# Patient Record
Sex: FEMALE | Race: WHITE | NOT HISPANIC OR LATINO | URBAN - NONMETROPOLITAN AREA
[De-identification: names, ages, dates, MRNs, and addresses within clinical notes are randomized per-mention and may not be internally consistent; named-entity substitution may affect disease eponyms.]

---

## 2024-09-26 ENCOUNTER — OFFICE VISIT (OUTPATIENT)
Dept: PRIMARY CARE | Facility: CLINIC | Age: 24
End: 2024-09-26
Payer: COMMERCIAL

## 2024-09-26 VITALS
HEIGHT: 62 IN | OXYGEN SATURATION: 98 % | SYSTOLIC BLOOD PRESSURE: 106 MMHG | HEART RATE: 88 BPM | WEIGHT: 124 LBS | BODY MASS INDEX: 22.82 KG/M2 | DIASTOLIC BLOOD PRESSURE: 70 MMHG

## 2024-09-26 DIAGNOSIS — J45.20 MILD INTERMITTENT REACTIVE AIRWAY DISEASE WITH WHEEZING WITHOUT COMPLICATION (HHS-HCC): Primary | ICD-10-CM

## 2024-09-26 DIAGNOSIS — R14.0 ABDOMINAL BLOATING: ICD-10-CM

## 2024-09-26 LAB
BASOPHILS # BLD AUTO: 0.07 X10*3/UL (ref 0–0.1)
BASOPHILS NFR BLD AUTO: 1 %
EOSINOPHIL # BLD AUTO: 0.16 X10*3/UL (ref 0–0.7)
EOSINOPHIL NFR BLD AUTO: 2.2 %
ERYTHROCYTE [DISTWIDTH] IN BLOOD BY AUTOMATED COUNT: 13.9 % (ref 11.5–14.5)
HCT VFR BLD AUTO: 42.7 % (ref 36–46)
HGB BLD-MCNC: 13.9 G/DL (ref 12–16)
IMM GRANULOCYTES # BLD AUTO: 0.02 X10*3/UL (ref 0–0.7)
IMM GRANULOCYTES NFR BLD AUTO: 0.3 % (ref 0–0.9)
LYMPHOCYTES # BLD AUTO: 1.59 X10*3/UL (ref 1.2–4.8)
LYMPHOCYTES NFR BLD AUTO: 22.1 %
MCH RBC QN AUTO: 31.3 PG (ref 26–34)
MCHC RBC AUTO-ENTMCNC: 32.6 G/DL (ref 32–36)
MCV RBC AUTO: 96 FL (ref 80–100)
MONOCYTES # BLD AUTO: 0.56 X10*3/UL (ref 0.1–1)
MONOCYTES NFR BLD AUTO: 7.8 %
NEUTROPHILS # BLD AUTO: 4.8 X10*3/UL (ref 1.2–7.7)
NEUTROPHILS NFR BLD AUTO: 66.6 %
NRBC BLD-RTO: 0 /100 WBCS (ref 0–0)
PLATELET # BLD AUTO: 276 X10*3/UL (ref 150–450)
RBC # BLD AUTO: 4.44 X10*6/UL (ref 4–5.2)
TSH SERPL-ACNC: 2.78 MIU/L (ref 0.44–3.98)
WBC # BLD AUTO: 7.2 X10*3/UL (ref 4.4–11.3)

## 2024-09-26 PROCEDURE — 99213 OFFICE O/P EST LOW 20 MIN: CPT

## 2024-09-26 PROCEDURE — 3008F BODY MASS INDEX DOCD: CPT

## 2024-09-26 PROCEDURE — 85025 COMPLETE CBC W/AUTO DIFF WBC: CPT

## 2024-09-26 PROCEDURE — 83516 IMMUNOASSAY NONANTIBODY: CPT

## 2024-09-26 PROCEDURE — 86003 ALLG SPEC IGE CRUDE XTRC EA: CPT

## 2024-09-26 PROCEDURE — 84443 ASSAY THYROID STIM HORMONE: CPT

## 2024-09-26 PROCEDURE — 1036F TOBACCO NON-USER: CPT

## 2024-09-26 ASSESSMENT — PATIENT HEALTH QUESTIONNAIRE - PHQ9
2. FEELING DOWN, DEPRESSED OR HOPELESS: NOT AT ALL
SUM OF ALL RESPONSES TO PHQ9 QUESTIONS 1 AND 2: 0
1. LITTLE INTEREST OR PLEASURE IN DOING THINGS: NOT AT ALL

## 2024-09-26 NOTE — PROGRESS NOTES
Subjective   Patient ID: Rissa Rios is a 23 y.o. female who presents for Bloated and Abdominal Pain (Stomach gets hard for few years off n on).    HPI  - Passed a large clot/chunk of tissue vaginally about golf ball sized about 3 years ago. She was having abdominal cramping at that time worse than a period. Period was 3 days late, and then 4 days later afterward she got her period. She had a positive pregnancy test at that time.   - Since then, she has had significant bloating. Intermittent at first, now more persistent. Has some tenderness to palpation in the lower abdomen but more so just bloating.  - Had internal and external pelvic exam and found bleeding around her ovaries 3 years ago, just watched   - Periods are heavy the first day, bad cramps to start the first day and then better. Lasts 4-5 days.  - Not on OCPs, when she was it did not help anyway   - Does not have an OBGYN -- previously saw Dr. Samaniego at [facility].   - Has some nausea. No v/d/c. Denies hematochezia, melena  - Urinary frequency. Denies hematuria, burning , itching  - Cannot tell if any foods are related.   - denies drugs, occasional alcohol, tobacco   - in a relationship, having sex 1-2 times per month. Has not been pregnant since above mentioned incident and not trying.     No current outpatient medications on file.   History reviewed. No pertinent surgical history.   Past Medical History:   Diagnosis Date    Low back pain, unspecified 11/08/2017    Lumbar pain    Other conditions influencing health status 02/21/2018    History of burning on urination    Personal history of other diseases of the respiratory system 12/19/2018    History of laryngitis    Personal history of other specified conditions 02/05/2017    History of syncope    Right lower quadrant pain 12/04/2019    Abdominal pain, RLQ (right lower quadrant)    Urinary tract infection, site not specified 02/21/2018    Acute lower UTI     Social History     Tobacco Use    Smoking  "status: Never    Smokeless tobacco: Never   Vaping Use    Vaping status: Some Days    Substances: Nicotine      No family history on file.   Review of Systems  10 point ROS negative except as otherwise noted in the HPI.      Objective   /70   Pulse 88   Ht 1.562 m (5' 1.5\")   Wt 56.2 kg (124 lb)   SpO2 98%   BMI 23.05 kg/m²    Physical Exam  Constitutional:       General: She is not in acute distress.     Appearance: Normal appearance.   HENT:      Head: Normocephalic and atraumatic.   Cardiovascular:      Rate and Rhythm: Normal rate and regular rhythm.      Pulses: Normal pulses.      Heart sounds: Normal heart sounds.   Pulmonary:      Effort: Pulmonary effort is normal.      Breath sounds: Normal breath sounds.   Abdominal:      General: Abdomen is flat. Bowel sounds are normal. There is no distension.      Palpations: Abdomen is soft. There is no mass.      Tenderness: There is abdominal tenderness. There is no guarding or rebound.      Comments: Diffuse TTP, worst in lower abdomen and b/l adnexa   Musculoskeletal:         General: Normal range of motion.   Skin:     General: Skin is warm and dry.   Neurological:      General: No focal deficit present.      Mental Status: She is alert and oriented to person, place, and time.   Psychiatric:         Mood and Affect: Mood normal.         Behavior: Behavior normal.           Assessment/Plan   Problem List Items Addressed This Visit       Abdominal bloating  - Pt w likely passed fetal products 3 years ago, reviewed picture and given the appearance and positive pregnancy test, this is most likely.   - Does not seem to be related to meals or to her menstrual cycle   - Will check CBC, TSH, food allergy and celiac given degree of bloating  - US pelvis ordered  - OBGYN referral placed, is also due for pap   - Will call w results     Relevant Orders    Celiac Panel    Food Allergy Profile IgE    Referral to Obstetrics / Gynecology    TSH with reflex to Free T4 " if abnormal    CBC and Auto Differential    US PELVIS TRANSABDOMINAL WITH TRANSVAGINAL       Discussed at visit any disease processes that were of concern as well as the risks, benefits and instructions on any new medication provided. Patient (and/or caretaker of patient if present) stated all questions were answered, and they voiced understanding of instructions.     Yesenia Calle PA-C

## 2024-09-26 NOTE — PATIENT INSTRUCTIONS
Obs in the area in include the [facility] and [facility]     We will order ultrasounda and some blood work and put in a referral for you to go to OB for further workup and to re-establish care for pap.    Epley Manuever for the vertigo- try looking it up on youtube

## 2024-09-27 LAB
CLAM IGE QN: <0.1 KU/L
CODFISH IGE QN: <0.1 KU/L
CORN IGE QN: <0.1
EGG WHITE IGE QN: <0.1 KU/L
GLIADIN PEPTIDE IGA SER IA-ACNC: <1 U/ML
MILK IGE QN: <0.1 KU/L
PEANUT IGE QN: <0.1 KU/L
SCALLOP IGE QN: <0.1 KU/L
SESAME SEED IGE QN: <0.1 KU/L
SHRIMP IGE QN: <0.1 KU/L
SOYBEAN IGE QN: <0.1 KU/L
TTG IGA SER IA-ACNC: <1 U/ML
WALNUT IGE QN: <0.1 KU/L
WHEAT IGE QN: <0.1 KU/L

## 2024-09-28 LAB
GLIADIN PEPTIDE IGG SER IA-ACNC: <0.56 FLU (ref 0–4.99)
TTG IGG SER IA-ACNC: <0.82 FLU (ref 0–4.99)

## 2024-09-30 ENCOUNTER — APPOINTMENT (OUTPATIENT)
Dept: OBSTETRICS AND GYNECOLOGY | Facility: CLINIC | Age: 24
End: 2024-09-30
Payer: COMMERCIAL

## 2024-09-30 VITALS — BODY MASS INDEX: 22.79 KG/M2 | DIASTOLIC BLOOD PRESSURE: 66 MMHG | SYSTOLIC BLOOD PRESSURE: 104 MMHG | WEIGHT: 122.6 LBS

## 2024-09-30 DIAGNOSIS — N94.10 DYSPAREUNIA IN FEMALE: Primary | ICD-10-CM

## 2024-09-30 DIAGNOSIS — Z32.02 PREGNANCY TEST NEGATIVE: ICD-10-CM

## 2024-09-30 DIAGNOSIS — R39.15 URINARY URGENCY: ICD-10-CM

## 2024-09-30 DIAGNOSIS — Z11.3 SCREEN FOR STD (SEXUALLY TRANSMITTED DISEASE): ICD-10-CM

## 2024-09-30 DIAGNOSIS — Z12.4 SCREENING FOR MALIGNANT NEOPLASM OF CERVIX: ICD-10-CM

## 2024-09-30 DIAGNOSIS — R10.2 PELVIC PAIN IN FEMALE: ICD-10-CM

## 2024-09-30 DIAGNOSIS — Z11.51 SCREENING FOR HUMAN PAPILLOMAVIRUS (HPV): ICD-10-CM

## 2024-09-30 DIAGNOSIS — R14.0 ABDOMINAL BLOATING: ICD-10-CM

## 2024-09-30 LAB
POC APPEARANCE, URINE: CLEAR
POC BILIRUBIN, URINE: NEGATIVE
POC BLOOD, URINE: NEGATIVE
POC COLOR, URINE: YELLOW
POC GLUCOSE, URINE: NEGATIVE MG/DL
POC KETONES, URINE: NEGATIVE MG/DL
POC LEUKOCYTES, URINE: NEGATIVE
POC NITRITE,URINE: NEGATIVE
POC PH, URINE: 6.5 PH
POC PROTEIN, URINE: NEGATIVE MG/DL
POC SPECIFIC GRAVITY, URINE: 1.01
POC UROBILINOGEN, URINE: 0.2 EU/DL
PREGNANCY TEST URINE, POC: NEGATIVE

## 2024-09-30 PROCEDURE — 87205 SMEAR GRAM STAIN: CPT

## 2024-09-30 PROCEDURE — 81025 URINE PREGNANCY TEST: CPT | Performed by: STUDENT IN AN ORGANIZED HEALTH CARE EDUCATION/TRAINING PROGRAM

## 2024-09-30 PROCEDURE — 81002 URINALYSIS NONAUTO W/O SCOPE: CPT | Performed by: STUDENT IN AN ORGANIZED HEALTH CARE EDUCATION/TRAINING PROGRAM

## 2024-09-30 PROCEDURE — 99203 OFFICE O/P NEW LOW 30 MIN: CPT | Performed by: STUDENT IN AN ORGANIZED HEALTH CARE EDUCATION/TRAINING PROGRAM

## 2024-09-30 PROCEDURE — 87591 N.GONORRHOEAE DNA AMP PROB: CPT

## 2024-09-30 PROCEDURE — 87491 CHLMYD TRACH DNA AMP PROBE: CPT

## 2024-09-30 PROCEDURE — 87661 TRICHOMONAS VAGINALIS AMPLIF: CPT

## 2024-09-30 NOTE — PROGRESS NOTES
Subjective   Patient ID: Rissa Rios is a 23 y.o. female who presents for new patient gyn visit and Pelvic Pain (Pelvic pain and bloated for a couple months ).  Patient is presenting for pelvic pain. It has been intermittent over the past few years. She has achy cramping that is especially present with her periods. It occurs between periods. She had tissue that came out in 2020. Based on a picture, it did appear to be a miscarriage.  She has been with a partner of 4 years.  She does have dyspareunia, It happens 100%. This has been present since 2020. No hx of sexual trauma.    She has been on birth control in the past.     She has heavy cramping with her periods. They last 4-5 days. The first day will have heavy bleeding. She will use 2 super tampons in a day, then she can switch to normal tampons.    No family hx of painful periods.  She has intermittent constipation with occasional white mucus in her stool. No blood in stool ever.    She has no pain with urination, but some increased frequency over the past month. She has new onset 1-2 onset nocturia. She makes it to the bathroom every time.    She has hx of right hip surgery. She did not need a replacement or hardware. She did physical therapy.  She did do gymnastics in her youth for a few years.        Review of Systems   All other systems reviewed and are negative.      Past Medical History:   Diagnosis Date    Low back pain, unspecified 11/08/2017    Lumbar pain    Other conditions influencing health status 02/21/2018    History of burning on urination    Personal history of other diseases of the respiratory system 12/19/2018    History of laryngitis    Personal history of other specified conditions 02/05/2017    History of syncope    Right lower quadrant pain 12/04/2019    Abdominal pain, RLQ (right lower quadrant)    Urinary tract infection, site not specified 02/21/2018    Acute lower UTI     Past Surgical History:   Procedure Laterality Date    HIP  SURGERY Right 2022       Objective   Physical Exam  General: A&Ox3  Head: Normocephalic, atraumatic  Heart/Lungs: RRR, No murmurs, gallops, or rubs. Lungs are CTAB.  Abdomen: Soft, nontender. BS+4. No bruising or masses.  Genitourinary: Labia and vagina normal in appearance. Uterus is small, mobile, anteverted. No adnexal masses palpated.  Mild bilateral pelvic floor tenderness noted over the levator ani.    Lower Extremities: No lower extremity Edema no palpable cords.     A chaperone was present for the exam.    Assessment/Plan   Problem List Items Addressed This Visit       Abdominal bloating    Relevant Orders    Referral to Physical Therapy    Referral to Gastroenterology    Pelvic pain in female    Overview     Patient has hx of miscarraige that was very traumatic. She does have a hx of right hip surgery.   Some intermittent constipation as well. I will refer her to GI as this may be contributing to her pain.  She does have some pelvic floor dysfunction noted on exam.   STI testing with yeast/BV obtained. U/a as well.    Endometriosis is in the differential, I did offer BC. Will see response to PFPT first.    Discussed options in detail. Will start with PFPT.           Other Visit Diagnoses       Dyspareunia in female    -  Primary    Relevant Orders    Referral to Physical Therapy    Vaginitis Gram Stain For Bacterial Vaginosis + Yeast    Screening for malignant neoplasm of cervix        Relevant Orders    THINPREP PAP    C. trachomatis / N. gonorrhoeae, Amplified (Completed)    Trichomonas vaginalis, Amplified (Completed)    Screening for human papillomavirus (HPV)        Relevant Orders    THINPREP PAP    C. trachomatis / N. gonorrhoeae, Amplified (Completed)    Trichomonas vaginalis, Amplified (Completed)    Screen for STD (sexually transmitted disease)        Relevant Orders    THINPREP PAP    C. trachomatis / N. gonorrhoeae, Amplified (Completed)    Trichomonas vaginalis, Amplified (Completed)     Pregnancy test negative        Relevant Orders    POCT pregnancy, urine manually resulted (Completed)    Urinary urgency        Relevant Orders    POCT UA (nonautomated) manually resulted (Completed)          Yves Raman MD 10/03/24 12:21 PM

## 2024-10-01 LAB
C TRACH RRNA SPEC QL NAA+PROBE: NEGATIVE
N GONORRHOEA DNA SPEC QL PROBE+SIG AMP: NEGATIVE
T VAGINALIS RRNA SPEC QL NAA+PROBE: NEGATIVE

## 2024-10-03 PROBLEM — R10.2 PELVIC PAIN IN FEMALE: Status: ACTIVE | Noted: 2024-10-03

## 2024-10-03 LAB
CLUE CELLS VAG LPF-#/AREA: NORMAL /[LPF]
NUGENT SCORE: 1
YEAST VAG WET PREP-#/AREA: NORMAL

## 2024-10-10 LAB
CYTOLOGY CMNT CVX/VAG CYTO-IMP: NORMAL
LAB AP HPV GENOTYPE QUESTION: YES
LAB AP HPV HR: NORMAL
LAB AP PAP ADDITIONAL TESTS: NORMAL
LABORATORY COMMENT REPORT: NORMAL
LMP START DATE: NORMAL
PATH REPORT.TOTAL CANCER: NORMAL

## 2024-10-15 ENCOUNTER — EVALUATION (OUTPATIENT)
Dept: PHYSICAL THERAPY | Facility: CLINIC | Age: 24
End: 2024-10-15
Payer: COMMERCIAL

## 2024-10-15 DIAGNOSIS — R14.0 ABDOMINAL BLOATING: ICD-10-CM

## 2024-10-15 DIAGNOSIS — N94.10 DYSPAREUNIA IN FEMALE: ICD-10-CM

## 2024-10-15 PROCEDURE — 97535 SELF CARE MNGMENT TRAINING: CPT | Mod: GP

## 2024-10-15 PROCEDURE — 97161 PT EVAL LOW COMPLEX 20 MIN: CPT | Mod: GP

## 2024-10-15 ASSESSMENT — PATIENT HEALTH QUESTIONNAIRE - PHQ9
2. FEELING DOWN, DEPRESSED OR HOPELESS: NOT AT ALL
1. LITTLE INTEREST OR PLEASURE IN DOING THINGS: NOT AT ALL
SUM OF ALL RESPONSES TO PHQ9 QUESTIONS 1 AND 2: 0

## 2024-10-15 ASSESSMENT — ENCOUNTER SYMPTOMS
DEPRESSION: 0
LOSS OF SENSATION IN FEET: 0
OCCASIONAL FEELINGS OF UNSTEADINESS: 0

## 2024-10-15 ASSESSMENT — PAIN - FUNCTIONAL ASSESSMENT: PAIN_FUNCTIONAL_ASSESSMENT: 0-10

## 2024-10-15 ASSESSMENT — PAIN SCALES - GENERAL: PAINLEVEL_OUTOF10: 0 - NO PAIN

## 2024-10-15 NOTE — PROGRESS NOTES
Physical Therapy    EVALUATION AND TREATMENT    Name: Rissa Rios  MRN: [id number]  : 2000  Today's Date: 10/16/24     Time Calculation  Start Time: 1448  Stop Time: 1539  Time Calculation (min): 51 min    Assessment:    Pt presenting to the clinic with primary complaints of abdominal bloating and dyspareunia with vaginal penetration. She has a recent R bony surgery 2 years ago. Pt unsure of specific procedure but appears to be a periacetabular resurfacing. These are minimal trigger points in her vaginal canal, except for her obturator. Dyspareunia pain not reproduced with vaginal exam.    Insurance:  Visit number: 1  Insurance Type: [employer]  Approved # of visits: ?  Authorization Needed: auth after 8 visits   Cert Date Ends On: ?    Plan:   PT Plan: Skilled PT  PT Frequency: Follow-up visit only  Rehab Potential: Good  Plan of Care Agreement: Patient  Planned interventions include: biofeedback, cryotherapy, education/instruction, electrical stimulation, gait training, home program, hot pack, kinesiotaping, manual therapy, neuromuscular re-education, self care/home management, therapeutic activities, and therapeutic exercises.   Access Code: [email or web address]  URL: [email or web address]  Date: 10/15/2024  Prepared by: Lulú Mayer    Exercises  - Clamshell  - 1 x daily - 7 x weekly - 2 sets - 10 reps - 5 seconds  hold  - Sidelying Hip Abduction  - 1 x daily - 7 x weekly - 2 sets - 10 reps - 5 seconds  hold  - Sidelying Reverse Clamshell  - 1 x daily - 7 x weekly - 2 sets - 10 reps - 5 seconds  hold    Current Problem:  1. Abdominal bloating  Referral to Physical Therapy    Follow Up In Physical Therapy      2. Dyspareunia in female  Referral to Physical Therapy    Follow Up In Physical Therapy          Subjective   General:  Reason for Referral: Dyspareunia  Referred By: Dr. Raman  Chief complaint: pelvic floor dysfunction, dyspareunia     Pain with insertion, does not lubricant.  There is pain with deeper penetration in her abdomen. Pt rates this discomfort as 6-7/10. She has no pain after intercourse, vaginally or abdominally. The pain does not prevent her from orgasm. She also has consistent abdominal bloating over the past few months. This is nearly almost there but feels like there are flare ups. Unsure if stress is contributory, does not think it is related to food. Prone towards loose stool. 1-2 bowel movements.   There is no pain with tampons, nor medical exams.   No birth control, periods are regular.   Right hip surgery for bony overgrowth   Precautions:  Precautions Comment: none     PMH: asthma  Fall Risk: no  Pain:  Pain Assessment: 0-10  0-10 (Numeric) Pain Score: 0 - No pain    Objective   External and internal assessment explained. Verbal consent obtained to proceed with vaginal or rectal exam and consented for the treatment approaches today. Patient understands they have power and right to stop examination at any time.     External Vaginal Observation:  Voluntary Contraction: +  Voluntary Relaxation: +  Involuntary Contraction: +   Involuntary Relaxation: +  Increased labial girth   No vestibule or inner vulvar pain or discomfort   Appropriate perineal body mobility   No descent noted with cough or curl up     Vaginal palpation external: none  1 o'clock (ischiocavernosus)  2 o'clock (bulbocavernosus)  3 o'clock (superficial transverse perineal)  4 o'clock (pubococcygeus)  5 o'clock (iliococcygeus)  6 o'clock (coccyx)  7 o'clock (iliococcygeus)  8 o'clock (pubococcygeus)  9 o'clock (superficial transverse perineal)  10 o'clock (bulbocavernosus)  11 o'clock (ischiocavernosus)  12 o'clock (pubic symphysis inferior angle)  Obturator: R > L in sidelying   Piriformis:    Vaginal palpation internal:  1 o'clock (ischiocavernosus)  2 o'clock (bulbocavernosus)  3 o'clock (superficial transverse perineal)  4 o'clock (pubococcygeus)  5 o'clock (iliococcygeus)  6 o'clock (coccyx)  7  o'clock (iliococcygeus)  8 o'clock (pubococcygeus)  9 o'clock (superficial transverse perineal)  10 o'clock (bulbocavernosus)  11 o'clock (ischiocavernosus)  12 o'clock (pubic symphysis inferior angle)  Obturator: R - does not reproduce concomitant symptom   Piriformis:    Pelvic Floor MMT Grade  0/zero: no palpable contraction/squeeze  1/trace: flicker of squeeze or contraction  2/poor: squeeze pressure asymmetrical or felt at various points- no lift or displacement  3/fair: squeeze pressure (contraction) and lift or displacement  4/good: squeeze pressure (contraction) and lift or displacement from anterior, posterior, and side walls - with cuing   5/strong: full circumference of finger compressed, displaced with an inward pull    [place] PERF(Power/Endurance/Repetitions/Fast Twitch)  4/3/2/6    Tender to palpation R glute minimus     Outcome Measure:   NIH CPSI pain 9 NIH CPSI urinary 5 NIH CPSI quality of life 8     Careplan Goals:  1. Pt will be independent in HEP to maximize PT POC   2. Pt will be able to improve worst pain severity on NPRS by >2 points MCID   3. Pt will improve NIH CPSI by >50% raw score    Jagruti Mayer, PT

## 2024-10-21 ENCOUNTER — HOSPITAL ENCOUNTER (OUTPATIENT)
Dept: RADIOLOGY | Facility: HOSPITAL | Age: 24
Discharge: HOME | End: 2024-10-21
Payer: COMMERCIAL

## 2024-10-21 DIAGNOSIS — R14.0 ABDOMINAL BLOATING: ICD-10-CM

## 2024-10-21 PROCEDURE — 76856 US EXAM PELVIC COMPLETE: CPT | Performed by: RADIOLOGY

## 2024-10-21 PROCEDURE — 76830 TRANSVAGINAL US NON-OB: CPT | Performed by: RADIOLOGY

## 2024-10-21 PROCEDURE — 76856 US EXAM PELVIC COMPLETE: CPT

## 2024-11-04 ENCOUNTER — LAB (OUTPATIENT)
Dept: LAB | Facility: LAB | Age: 24
End: 2024-11-04
Payer: COMMERCIAL

## 2024-11-04 ENCOUNTER — APPOINTMENT (OUTPATIENT)
Dept: OBSTETRICS AND GYNECOLOGY | Facility: CLINIC | Age: 24
End: 2024-11-04
Payer: COMMERCIAL

## 2024-11-04 VITALS — WEIGHT: 122.4 LBS | BODY MASS INDEX: 22.75 KG/M2 | DIASTOLIC BLOOD PRESSURE: 56 MMHG | SYSTOLIC BLOOD PRESSURE: 90 MMHG

## 2024-11-04 DIAGNOSIS — N94.6 DYSMENORRHEA: ICD-10-CM

## 2024-11-04 DIAGNOSIS — G89.29 CHRONIC MIDLINE LOW BACK PAIN WITHOUT SCIATICA: ICD-10-CM

## 2024-11-04 DIAGNOSIS — M54.50 CHRONIC MIDLINE LOW BACK PAIN WITHOUT SCIATICA: ICD-10-CM

## 2024-11-04 DIAGNOSIS — R10.2 PELVIC PAIN IN FEMALE: Primary | ICD-10-CM

## 2024-11-04 LAB
ALBUMIN SERPL BCP-MCNC: 4.5 G/DL (ref 3.4–5)
ALP SERPL-CCNC: 45 U/L (ref 33–110)
ALT SERPL W P-5'-P-CCNC: 11 U/L (ref 7–45)
ANION GAP SERPL CALC-SCNC: 10 MMOL/L (ref 10–20)
AST SERPL W P-5'-P-CCNC: 13 U/L (ref 9–39)
BILIRUB SERPL-MCNC: 0.6 MG/DL (ref 0–1.2)
BUN SERPL-MCNC: 11 MG/DL (ref 6–23)
CALCIUM SERPL-MCNC: 9.1 MG/DL (ref 8.6–10.3)
CHLORIDE SERPL-SCNC: 104 MMOL/L (ref 98–107)
CO2 SERPL-SCNC: 27 MMOL/L (ref 21–32)
CREAT SERPL-MCNC: 0.85 MG/DL (ref 0.5–1.05)
EGFRCR SERPLBLD CKD-EPI 2021: >90 ML/MIN/1.73M*2
GLUCOSE SERPL-MCNC: 70 MG/DL (ref 74–99)
POTASSIUM SERPL-SCNC: 4 MMOL/L (ref 3.5–5.3)
PROT SERPL-MCNC: 7.1 G/DL (ref 6.4–8.2)
SODIUM SERPL-SCNC: 137 MMOL/L (ref 136–145)

## 2024-11-04 PROCEDURE — 80053 COMPREHEN METABOLIC PANEL: CPT

## 2024-11-04 PROCEDURE — 36415 COLL VENOUS BLD VENIPUNCTURE: CPT

## 2024-11-04 PROCEDURE — 99213 OFFICE O/P EST LOW 20 MIN: CPT | Performed by: STUDENT IN AN ORGANIZED HEALTH CARE EDUCATION/TRAINING PROGRAM

## 2024-11-04 RX ORDER — TRANEXAMIC ACID 650 MG/1
1300 TABLET ORAL 3 TIMES DAILY PRN
Qty: 30 TABLET | Refills: 2 | Status: SHIPPED | OUTPATIENT
Start: 2024-11-04

## 2024-11-04 NOTE — PROGRESS NOTES
Subjective   Patient ID: Rissa Rios is a 23 y.o. female who presents for gyn follow up  (PFT/Some relief to pelvic pain).  Patient is following up on pelvic pain. She had one round of PFPT. She has significant pain surrounding her periods.    She has heavy cramping with her periods. They last 4-5 days. The first day will have heavy bleeding. She will use 2 super tampons in a day, then she can switch to normal tampons.     No family hx of painful periods.  She has intermittent constipation with occasional white mucus in her stool. No blood in stool ever.     She has no pain with urination, but some increased frequency over the past month. She has new onset 1-2 onset nocturia. She makes it to the bathroom every time.     She has hx of right hip surgery. She did not need a replacement or hardware. She did physical therapy.  She did do gymnastics in her youth for a few years.    She has not yet been seen by GI.          Review of Systems   All other systems reviewed and are negative.      Objective   Physical Exam  General: A&Ox3  Head: Normocephalic, atraumatic  Heart/Lungs: RRR, No murmurs, gallops, or rubs. Lungs are CTAB.  Abdomen: Soft, nontender. BS+4. No bruising or masses.  Lower Extremities: No lower extremity Edema no palpable cords.     A chaperone was present for the exam.    Assessment/Plan   Problem List Items Addressed This Visit       Pelvic pain in female - Primary    Overview     Patient has hx of miscarraige that was very traumatic. She does have a hx of right hip surgery.   Some intermittent constipation as well. I will refer her to GI as this may be contributing to her pain.  She does have some pelvic floor dysfunction noted on exam. S/p PFPT.    STI testing with yeast/BV obtained. U/a as well.  High suspicion for endometriosis. We discussed surgical and medical options. Patient does not want to pursue these at this time.  Consider TXA as a nonhormonal option.  Given hx of gymnastics and hip  injury, will obtain imaging of CT spine.  Follow up in 2 months.           Relevant Medications    tranexamic acid (Lysteda) 650 mg tablet tablet     Other Visit Diagnoses       Dysmenorrhea        Relevant Medications    tranexamic acid (Lysteda) 650 mg tablet tablet    Chronic midline low back pain without sciatica        Relevant Orders    CT lumbar spine wo IV contrast    Comprehensive Metabolic Panel (Completed)    CT thoracic spine wo IV contrast            Yves Raman MD 11/07/24 4:01 PM

## 2024-11-11 ENCOUNTER — APPOINTMENT (OUTPATIENT)
Dept: RADIOLOGY | Facility: HOSPITAL | Age: 24
End: 2024-11-11
Payer: COMMERCIAL

## 2024-11-11 ENCOUNTER — APPOINTMENT (OUTPATIENT)
Dept: PHYSICAL THERAPY | Facility: CLINIC | Age: 24
End: 2024-11-11
Payer: COMMERCIAL

## 2024-11-20 ENCOUNTER — APPOINTMENT (OUTPATIENT)
Dept: RADIOLOGY | Facility: HOSPITAL | Age: 24
End: 2024-11-20
Payer: COMMERCIAL

## 2025-01-14 ENCOUNTER — TELEPHONE (OUTPATIENT)
Dept: OBSTETRICS AND GYNECOLOGY | Facility: CLINIC | Age: 25
End: 2025-01-14
Payer: COMMERCIAL

## 2025-01-14 NOTE — TELEPHONE ENCOUNTER
Patient had a CT order put in back in November, but her insurance company is stating that they will not be able to approve the request until certain labs results are sent in to deem it necessary. Patient is unsure of what labs in particular they are referring to and would like a call back to discuss what the rest of this process looks like to get the approval.

## 2025-01-30 ENCOUNTER — APPOINTMENT (OUTPATIENT)
Dept: PRIMARY CARE | Facility: CLINIC | Age: 25
End: 2025-01-30
Payer: COMMERCIAL

## 2025-01-30 VITALS
HEART RATE: 84 BPM | OXYGEN SATURATION: 99 % | WEIGHT: 121 LBS | BODY MASS INDEX: 22.49 KG/M2 | DIASTOLIC BLOOD PRESSURE: 60 MMHG | SYSTOLIC BLOOD PRESSURE: 108 MMHG

## 2025-01-30 DIAGNOSIS — R55 VASOVAGAL SYNCOPE: Primary | ICD-10-CM

## 2025-01-30 PROCEDURE — 99214 OFFICE O/P EST MOD 30 MIN: CPT

## 2025-01-30 NOTE — PROGRESS NOTES
Subjective   Patient ID: Rissa Rios is a 24 y.o. female who presents for having some issues (Feeling bloated and just stomach issues.  Also passing out when she is on the toilet.).  HPI  - varies back and forth between diarrhea and constipation , more diarrhea than constipation   - intermittently will have blood when she wipes, but not in the stool  - no mucus in stool   - abdominal pain and cramping with bowel movements    - last night had a bad stomach ache in the middle of the night, got up and went to the bathroom, got sweaty and dizzy and ears rang and then passed out. Has happened five times. Was not straining. No cp/sob.  - always during or after BM.   - mom has always been there and never has said she had seizure like activity   - feels tired and a little out of it after but not confused  - previously saw JT for this  - no personal or family hx of heart diseases/arrhythmias     - new onset nocturia 1-2x at night, thinks she pees a little more during the day now too.   - weight is stable  - appetite fluctuates- sometimes will want to eat all day sometimes one meal a day and is fine  - has GI appt 2/4/25     - also had painful periods w heavy bleeding. OBGYN gave her tranexamic acid and she states she does not plan on taking it. He wanted her to take ocps and she said no. Had her do pelvic floor pt who did one visit with her and told her she didnt need to see her anymore.  - this past month had cramping for more days than normal, bleeding heavier than normal.  - periods are 21-25 days apart    Current Outpatient Medications:     tranexamic acid (Lysteda) 650 mg tablet tablet, Take 2 tablets (1,300 mg) by mouth 3 times a day as needed (For cramping related to periods.)., Disp: 30 tablet, Rfl: 2   Past Surgical History:   Procedure Laterality Date    HIP SURGERY Right 2022      Past Medical History:   Diagnosis Date    Low back pain, unspecified 11/08/2017    Lumbar pain    Other conditions influencing  health status 02/21/2018    History of burning on urination    Personal history of other diseases of the respiratory system 12/19/2018    History of laryngitis    Personal history of other specified conditions 02/05/2017    History of syncope    Right lower quadrant pain 12/04/2019    Abdominal pain, RLQ (right lower quadrant)    Urinary tract infection, site not specified 02/21/2018    Acute lower UTI     Social History     Tobacco Use    Smoking status: Never    Smokeless tobacco: Current    Tobacco comments:     Vape with nicotine occasional    Vaping Use    Vaping status: Some Days    Substances: Nicotine   Substance Use Topics    Alcohol use: Yes     Comment: occasional    Drug use: Not Currently      No family history on file.   Review of Systems  10 point ROS negative except as otherwise noted in the HPI.      Objective   /60 (BP Location: Right arm, Patient Position: Sitting, BP Cuff Size: Large adult)   Pulse 84   Wt 54.9 kg (121 lb)   SpO2 99%   BMI 22.49 kg/m²    Physical Exam  Vitals reviewed.   Constitutional:       Appearance: Normal appearance.   HENT:      Head: Normocephalic and atraumatic.   Cardiovascular:      Rate and Rhythm: Normal rate and regular rhythm.      Pulses: Normal pulses.      Heart sounds: Normal heart sounds.   Pulmonary:      Effort: Pulmonary effort is normal.      Breath sounds: Normal breath sounds.   Abdominal:      General: Abdomen is flat.      Palpations: Abdomen is soft.      Tenderness: There is no abdominal tenderness.   Musculoskeletal:         General: Normal range of motion.   Skin:     General: Skin is warm and dry.      Findings: No rash.   Neurological:      General: No focal deficit present.      Mental Status: She is alert and oriented to person, place, and time.   Psychiatric:         Mood and Affect: Mood normal.         Behavior: Behavior normal.           Assessment/Plan   Problem List Items Addressed This Visit    None  Visit Diagnoses        Vasovagal syncope    -  Primary  - Pt having syncopal episodes during or after Bms. Not with straining. Has had previous cardiac workup of echo and holter, reports they were normal but i cannot find results. Will try and request them from [facility].   - order tilt table , c/f pots    F/u with GI-- c/f IBS given alternating constipation and diarrhea.  F/u with OBGYN-- still strongly suspicious of endometriosis. She does not want OCPs.     Relevant Orders    Autonomic Testing            Discussed at visit any disease processes that were of concern as well as the risks, benefits and instructions on any new medication provided. Patient (and/or caretaker of patient if present) stated all questions were answered, and they voiced understanding of instructions.     MARLON DejesusC

## 2025-01-30 NOTE — PATIENT INSTRUCTIONS
I would like to work you up for POTS. Schedule tilt table test. Pending that, we will do further cardiac workup. If you have chest pain, shortness of breath, racing heart with palpitations, go to the ER.   In the meantime, you can try some of the supportive care options we have for POTS to see if they help your symptoms.     Increase oral fluids with a goal of 3 liters per daily and increase salt intake with a goal of 8 to 12 grams per day. This can be obtained from table salt, sports beverages, salt tablets. We discussed the effects of salt on blood pressure. Exercises can include recumbent biking, rowing, or swimming, as well as lower extremity resistance training to help reduce swelling in the legs. You may need to have supervision when you start these exercise programs to monitor your symptoms. Try to remain upright and avoid prolonged bed rest. You can also wear compression stockings or abdominal binders to help prevent fluids from pooling in your legs.      The following conservative measures have been shown to be helpful in POTS:    - To drink one gallon fluids / day  - To increase salt intake up to 3 tea spoons/day  -To use compression stockings 30-40 mm/hg and high waist during the day time  - To perform waterjogging for one hour a day: ( running in the shallow water at the end of the pool )  - To raise the head of the bed ( bed head elevation about 45 degrees).          For the abdominal symptoms, I am eager to see what GI says next week  I am still suspicious of endometriosis. OBGYN will need to discuss further options with you as far as diagonsis and management.

## 2025-02-03 ENCOUNTER — APPOINTMENT (OUTPATIENT)
Dept: OBSTETRICS AND GYNECOLOGY | Facility: CLINIC | Age: 25
End: 2025-02-03
Payer: COMMERCIAL

## 2025-02-03 VITALS — BODY MASS INDEX: 22.57 KG/M2 | SYSTOLIC BLOOD PRESSURE: 90 MMHG | WEIGHT: 121.4 LBS | DIASTOLIC BLOOD PRESSURE: 60 MMHG

## 2025-02-03 DIAGNOSIS — N80.9 ENDOMETRIOSIS: Primary | ICD-10-CM

## 2025-02-03 DIAGNOSIS — R10.2 PELVIC PAIN IN FEMALE: ICD-10-CM

## 2025-02-03 PROCEDURE — 99213 OFFICE O/P EST LOW 20 MIN: CPT | Performed by: STUDENT IN AN ORGANIZED HEALTH CARE EDUCATION/TRAINING PROGRAM

## 2025-02-03 NOTE — PROGRESS NOTES
Subjective   Patient ID: Rissa Rios is a 24 y.o. female who presents for Follow-up (Lab results /Concerns with bloating).  Patient is following up on pelvic pain. She had one round of PFPT. She has significant pain surrounding her periods.     She has heavy cramping with her periods. They last 4-5 days. The first day will have heavy bleeding. She will use 2 super tampons in a day, then she can switch to normal tampons. Intermittent dyspareunia, but not as her period cramps.    Her main concern is her bloating. She also has intermittent episodes of syncope.     No family hx of painful periods.  She has intermittent constipation with occasional white mucus in her stool. No blood in stool ever.     She has no pain with urination, but some increased frequency over the past month. She has new onset 1-2 onset nocturia. She makes it to the bathroom every time.     She has hx of right hip surgery. She did not need a replacement or hardware. She did physical therapy.  She did do gymnastics in her youth for a few years.     She has not yet been seen by GI.    Patient has chronic back pain that is midline. It has been present since her youth. She goes to a chiropractor occasionally. She was told she had scoliosis as well.        Review of Systems   All other systems reviewed and are negative.      Objective   Physical Exam  General: A&Ox3  Head: Normocephalic, atraumatic  Heart/Lungs: RRR, No murmurs, gallops, or rubs. Lungs are CTAB.  Abdomen: Soft, nontender. BS+4. No bruising or masses.  Lower Extremities: No lower extremity Edema no palpable cords.       Assessment/Plan   Problem List Items Addressed This Visit       Pelvic pain in female    Overview     Patient has hx of miscarraige that was very traumatic. She does have a hx of right hip surgery.   Some intermittent constipation as well. I will refer her to GI as this may be contributing to her pain and Gyn pelvic pain is strongly associated with IBS. Improvement of  IBS has been linked to improved pain with endometriosis.    STI testing with yeast/BV obtained. U/a as well.  High suspicion for endometriosis. We went over medical and surgical options once more. Patient would like imaging to confirm endometriosis first. MRI with endometriosis protocol has been ordered.  Consider TXA as a nonhormonal option.  Consider PT for history of back pain with gymnastics in her childhood.    Follow up in 1-2 months or sooner prn.            Other Visit Diagnoses       Endometriosis    -  Primary    Relevant Orders    MR pelvis w and wo IV contrast              Yves Raman MD 02/03/25 1:37 PM

## 2025-02-04 ENCOUNTER — APPOINTMENT (OUTPATIENT)
Facility: CLINIC | Age: 25
End: 2025-02-04
Payer: COMMERCIAL

## 2025-02-04 DIAGNOSIS — K92.1 HEMATOCHEZIA: ICD-10-CM

## 2025-02-04 DIAGNOSIS — R19.4 ALTERED BOWEL HABITS: ICD-10-CM

## 2025-02-04 DIAGNOSIS — R14.0 ABDOMINAL BLOATING: Primary | ICD-10-CM

## 2025-02-04 DIAGNOSIS — R10.2 PELVIC PAIN IN FEMALE: Primary | ICD-10-CM

## 2025-02-04 PROBLEM — R19.7 DIARRHEA: Status: ACTIVE | Noted: 2025-02-04

## 2025-02-04 PROBLEM — R19.7 DIARRHEA: Status: RESOLVED | Noted: 2025-02-04 | Resolved: 2025-02-04

## 2025-02-04 PROCEDURE — 99204 OFFICE O/P NEW MOD 45 MIN: CPT | Performed by: STUDENT IN AN ORGANIZED HEALTH CARE EDUCATION/TRAINING PROGRAM

## 2025-02-04 ASSESSMENT — ENCOUNTER SYMPTOMS
DIARRHEA: 1
CHILLS: 0
TROUBLE SWALLOWING: 0
FEVER: 0
ANAL BLEEDING: 1
VOMITING: 0
COLOR CHANGE: 0
CONSTIPATION: 1
NAUSEA: 0
ABDOMINAL DISTENTION: 1
ABDOMINAL PAIN: 0
RECTAL PAIN: 0
UNEXPECTED WEIGHT CHANGE: 0
SHORTNESS OF BREATH: 0
BLOOD IN STOOL: 0

## 2025-02-04 NOTE — PROGRESS NOTES
"Chief Complaint:  Chief Complaint   Patient presents with    Bloated       1 yr of abdominal bloating, mid abdomen, with abdominal distention \"look 9 mo pregnant\" feels full of air.     Some days has bloating on waking  Other days bloating is after eating and at the end of the day  Unclear food triggers.    No burping  Some flatulence with minimal improvement in bloating    BM every other day  Longest between BM 3 days  [place] 3-6  A \"dot or two\" of red blood when wiping rarely  Intermittent straining   No abdominal pain, occasional cramping    5-6 episodes of syncope while on toilet. Started 2021. Feel hot, cramping, ears ringing. Has had some work-up so far normal.    Fruit: banana daily, maybe apple  Veggies: 1 serving/day  Whole Grain: cut out bread recently, 0  Water: 60oz daily     No gum chewing  No carbonated drinks  Coffee with [employer] creamer                Review of Systems   Constitutional:  Negative for chills, fever and unexpected weight change.   HENT:  Negative for trouble swallowing.    Respiratory:  Negative for shortness of breath.    Cardiovascular:  Negative for chest pain.   Gastrointestinal:  Positive for abdominal distention, anal bleeding, constipation and diarrhea. Negative for abdominal pain, blood in stool, nausea, rectal pain and vomiting.   Skin:  Negative for color change.     No family history on file.    Medications    Current Outpatient Medications:     tranexamic acid (Lysteda) 650 mg tablet tablet, Take 2 tablets (1,300 mg) by mouth 3 times a day as needed (For cramping related to periods.). (Patient not taking: Reported on 2/3/2025), Disp: 30 tablet, Rfl: 2    Vitals  LMP 01/25/2025 (Exact Date)     Physical Exam  Constitutional:       General: She is not in acute distress.  HENT:      Head: Normocephalic and atraumatic.   Eyes:      General: No scleral icterus.     Conjunctiva/sclera: Conjunctivae normal.   Cardiovascular:      Rate and Rhythm: Normal rate.      Heart sounds: " "Normal heart sounds.   Pulmonary:      Effort: Pulmonary effort is normal.      Breath sounds: No wheezing.   Abdominal:      General: Bowel sounds are normal. There is no distension.      Palpations: Abdomen is soft.      Tenderness: There is abdominal tenderness (right mid abdomen). There is no guarding or rebound.      Hernia: No hernia is present.   Skin:     General: Skin is warm.      Coloration: Skin is not jaundiced.   Neurological:      General: No focal deficit present.      Mental Status: She is alert and oriented to person, place, and time.   Psychiatric:         Mood and Affect: Mood normal.           Labs:  No results found for: \"AFP\"No results found for: \"ASMAB\", \"MITOAB\"No results found for: \"ROGERS\"No results found for: \"ASMAB\", \"MITOAB\"No results found for: \"LKRBDVBG08\"No results found for: \"HEPCAB\"No results found for: \"HEPATOT\", \"HEPAIGM\", \"HEPBCIGM\", \"HEPBCAB\", \"HBEAG\", \"HEPCAB\"No results found for: \"HIV1X2\"No results found for: \"IRON\", \"TIBC\", \"FERRITIN\"No results found for: \"INR\", \"PROTIME\"  Lab Results   Component Value Date    TSH 2.78 09/26/2024       A/P   Rissa was seen today for bloated.  Diagnoses and all orders for this visit:  Abdominal bloating (Primary)  -     Referral to Gastroenterology  -     XR abdomen 1 view; Future  -     IgA; Future  -     H. Pylori Antigen, Stool; Future  -     Follow Up In Gastroenterology; Future  -     IgA  -     H. Pylori Antigen, Stool  -     Calprotectin, Fecal; Future  -     Calprotectin, Fecal  Altered bowel habits  Hematochezia     Problem List Items Addressed This Visit             ICD-10-CM    Abdominal bloating - Primary R14.0     Discussed mechanisms of gas and bloating including the viscerosomatic reflex, diet, SIBO, constipation and FODMAPs. Celiac screen normal but IgA not checked.   - check H.pylori  - assess for and treat constipation   - discussed and provided handout on low FODMAP diet  - consider SIBO testing based on above         " Relevant Orders    XR abdomen 1 view    IgA    H. Pylori Antigen, Stool    Follow Up In Gastroenterology    Calprotectin, Fecal    Altered bowel habits R19.4     Bowel movements vary between [place] 3-6, daily to every three days. Several episodes of syncope related to bowel movement. Diet is low in fiber. Suspect underlying constipation with possible overflow diarrhea which could be causing cramping episodes and syncope as well as bloating. Other differentials include IBS, less likely IBD.   - KUB to assess stool burden   - high fiber diet discussed with hand out provided   - start fiber supplement: Metamucil, Benefiber or Citrucel generic; take 1 tbsp/1 gummies/1 capsules daily for one week with adequate water, if no improvement in bowel consistency after one week increase to two and so on. Must take daily   - Miralax three times weekly   - check calprotectin  - if no improvement with above recommend colonoscopy            Hematochezia K92.1     Discussed possible etiologies of rectal bleeding including hemorrhoids or IBD, less likely polyps or masses such as colorectal cancer. Can consider colonoscopy now for further eval vs treat suspected constipation and consider colonoscopy if bleeding persists or worsens  Patient prefers medical management at this time

## 2025-02-04 NOTE — ASSESSMENT & PLAN NOTE
Discussed mechanisms of gas and bloating including the viscerosomatic reflex, diet, SIBO, constipation and FODMAPs. Celiac screen normal but IgA not checked.   - check H.pylori  - assess for and treat constipation   - discussed and provided handout on low FODMAP diet  - consider SIBO testing based on above

## 2025-02-04 NOTE — ASSESSMENT & PLAN NOTE
Discussed possible etiologies of rectal bleeding including hemorrhoids or IBD, less likely polyps or masses such as colorectal cancer. Can consider colonoscopy now for further eval vs treat suspected constipation and consider colonoscopy if bleeding persists or worsens  Patient prefers medical management at this time

## 2025-02-04 NOTE — PATIENT INSTRUCTIONS
Bloating can be caused from multiple things like constipation, diet, bacterial overgrowth, stomach infection. Lets evaluation for constipation and check for a stomach infection first as well as review diet triggers    For Miralax and Fiber    Take 1 capful/1 packet of Miralax at night with 8oz of liquid. Take three times over 7 days for the 1st week. May have some loose stool/diarrhea as the bowels are emptied. Can adjust the dose of Miralax as needed to obtain the preferred stool consistency and frequency.  After 1 week of Miralax  start a fiber supplement daily. Suggest Benefiber or Cirtrucel. Start low and increase dose slow. Start with 1 tsp/ 1 capsule/ 1 gummy of fiber each day. Must drink atleast 8 cups of water throughout the day. Can increase the dose of fiber after 1 week of daily use. It is important to take this DAILY.     Fiber is what helps keep stool moving throughout the colon and keeps stool from being too hard or too loose!     Review the LOW FODMAP and try to identify triggers    Get a blood and stool test done and schedule your abdominal xray

## 2025-02-04 NOTE — ASSESSMENT & PLAN NOTE
Bowel movements vary between [place] 3-6, daily to every three days. Several episodes of syncope related to bowel movement. Diet is low in fiber. Suspect underlying constipation with possible overflow diarrhea which could be causing cramping episodes and syncope as well as bloating. Other differentials include IBS, less likely IBD.   - KUB to assess stool burden   - high fiber diet discussed with hand out provided   - start fiber supplement: Metamucil, Benefiber or Citrucel generic; take 1 tbsp/1 gummies/1 capsules daily for one week with adequate water, if no improvement in bowel consistency after one week increase to two and so on. Must take daily   - Miralax three times weekly   - check calprotectin  - if no improvement with above recommend colonoscopy

## 2025-02-11 ENCOUNTER — HOSPITAL ENCOUNTER (OUTPATIENT)
Dept: RADIOLOGY | Facility: HOSPITAL | Age: 25
Discharge: HOME | End: 2025-02-11
Payer: COMMERCIAL

## 2025-02-11 DIAGNOSIS — R14.0 ABDOMINAL BLOATING: ICD-10-CM

## 2025-02-11 PROCEDURE — 74018 RADEX ABDOMEN 1 VIEW: CPT

## 2025-02-12 DIAGNOSIS — R14.0 ABDOMINAL BLOATING: Primary | ICD-10-CM

## 2025-02-24 ENCOUNTER — HOSPITAL ENCOUNTER (OUTPATIENT)
Dept: RADIOLOGY | Facility: HOSPITAL | Age: 25
Discharge: HOME | End: 2025-02-24
Payer: COMMERCIAL

## 2025-03-03 ENCOUNTER — HOSPITAL ENCOUNTER (OUTPATIENT)
Dept: RADIOLOGY | Facility: HOSPITAL | Age: 25
Discharge: HOME | End: 2025-03-03
Payer: COMMERCIAL

## 2025-03-03 DIAGNOSIS — N80.9 ENDOMETRIOSIS: ICD-10-CM

## 2025-03-03 PROCEDURE — 2550000001 HC RX 255 CONTRASTS: Mod: SE | Performed by: STUDENT IN AN ORGANIZED HEALTH CARE EDUCATION/TRAINING PROGRAM

## 2025-03-03 PROCEDURE — A9575 INJ GADOTERATE MEGLUMI 0.1ML: HCPCS | Mod: SE | Performed by: STUDENT IN AN ORGANIZED HEALTH CARE EDUCATION/TRAINING PROGRAM

## 2025-03-03 PROCEDURE — 72197 MRI PELVIS W/O & W/DYE: CPT

## 2025-03-03 RX ORDER — GADOTERATE MEGLUMINE 376.9 MG/ML
0.2 INJECTION INTRAVENOUS
Status: COMPLETED | OUTPATIENT
Start: 2025-03-03 | End: 2025-03-03

## 2025-03-03 RX ADMIN — GADOTERATE MEGLUMINE 10 ML: 376.9 INJECTION INTRAVENOUS at 11:06

## 2025-03-10 ENCOUNTER — TELEPHONE (OUTPATIENT)
Dept: OBSTETRICS AND GYNECOLOGY | Facility: CLINIC | Age: 25
End: 2025-03-10
Payer: COMMERCIAL

## 2025-03-17 ENCOUNTER — APPOINTMENT (OUTPATIENT)
Dept: OBSTETRICS AND GYNECOLOGY | Facility: CLINIC | Age: 25
End: 2025-03-17
Payer: COMMERCIAL

## 2025-03-18 ENCOUNTER — APPOINTMENT (OUTPATIENT)
Dept: OBSTETRICS AND GYNECOLOGY | Facility: CLINIC | Age: 25
End: 2025-03-18
Payer: COMMERCIAL

## 2025-03-18 VITALS — DIASTOLIC BLOOD PRESSURE: 64 MMHG | WEIGHT: 120 LBS | BODY MASS INDEX: 22.31 KG/M2 | SYSTOLIC BLOOD PRESSURE: 104 MMHG

## 2025-03-18 DIAGNOSIS — R10.2 PELVIC PAIN IN FEMALE: Primary | ICD-10-CM

## 2025-03-18 PROCEDURE — 99213 OFFICE O/P EST LOW 20 MIN: CPT | Performed by: STUDENT IN AN ORGANIZED HEALTH CARE EDUCATION/TRAINING PROGRAM

## 2025-03-18 NOTE — PROGRESS NOTES
Subjective   Patient ID: Rissa Rios is a 24 y.o. female who presents for Results (MRI results ).  Patient is following up on pelvic pain. She had one round of PFPT. She has significant pain surrounding her periods.     She has heavy cramping with her periods. They last 4-5 days. The first day will have heavy bleeding. She will use 2 super tampons in a day, then she can switch to normal tampons. Intermittent dyspareunia, but not as her period cramps.    Her main concern is her bloating. She also has intermittent episodes of syncope.     No family hx of painful periods.  She has intermittent constipation with occasional white mucus in her stool. No blood in stool ever.     She has no pain with urination, but some increased frequency over the past month. She has new onset 1-2 onset nocturia. She makes it to the bathroom every time.     She has hx of right hip surgery. She did not need a replacement or hardware. She did physical therapy.  She did do gymnastics in her youth for a few years.     She has not yet been seen by GI.    Patient has chronic back pain that is midline. It has been present since her youth. She goes to a chiropractor occasionally. She was told she had scoliosis as well.        Review of Systems   All other systems reviewed and are negative.      Objective   Physical Exam  General: A&Ox3  Head: Normocephalic, atraumatic  Heart/Lungs: RRR, No murmurs, gallops, or rubs. Lungs are CTAB.  Abdomen: Soft, nontender. BS+4. No bruising or masses.  Lower Extremities: No lower extremity Edema no palpable cords.       Assessment/Plan   Problem List Items Addressed This Visit       Pelvic pain in female - Primary    Overview     Patient has hx of miscarraige that was very traumatic. She does have a hx of right hip surgery.   Some intermittent constipation as well. I will refer her to GI as this may be contributing to her pain and Gyn pelvic pain is strongly associated with IBS. Improvement of IBS has been  linked to improved pain with endometriosis.    STI testing with yeast/BV obtained. U/a as well.  High suspicion for endometriosis. We went over medical and surgical options once more.  Despite a normal MRI, these can miss up to 20% of implants. If she decides on surgical or medical therapy, will initiate. For now patient would like to continue expectant management.    Follow up in 1-2 months or sooner prn.                  Yves Raman MD 03/31/25 2:20 PM

## 2025-03-21 ENCOUNTER — TELEPHONE (OUTPATIENT)
Dept: OBSTETRICS AND GYNECOLOGY | Facility: CLINIC | Age: 25
End: 2025-03-21

## 2025-03-21 NOTE — TELEPHONE ENCOUNTER
Rissa called stating she is having extreme pain and bloating today. She does not want to take the medicine for pain because she does not want to rely on medication. She is requesting a call. Thank you

## 2025-03-31 ENCOUNTER — HOSPITAL ENCOUNTER (OUTPATIENT)
Dept: NEUROLOGY | Facility: CLINIC | Age: 25
Discharge: HOME | End: 2025-03-31
Payer: COMMERCIAL

## 2025-03-31 DIAGNOSIS — R55 VASOVAGAL SYNCOPE: ICD-10-CM

## 2025-03-31 PROCEDURE — 95924 ANS PARASYMP & SYMP W/TILT: CPT | Performed by: PSYCHIATRY & NEUROLOGY

## 2025-03-31 PROCEDURE — 95923 AUTONOMIC NRV SYST FUNJ TEST: CPT | Performed by: PSYCHIATRY & NEUROLOGY

## 2025-04-09 ENCOUNTER — APPOINTMENT (OUTPATIENT)
Facility: CLINIC | Age: 25
End: 2025-04-09
Payer: COMMERCIAL

## 2025-04-09 DIAGNOSIS — R14.0 ABDOMINAL BLOATING: ICD-10-CM

## 2025-04-09 DIAGNOSIS — K92.1 HEMATOCHEZIA: ICD-10-CM

## 2025-04-09 DIAGNOSIS — R19.4 ALTERED BOWEL HABITS: Primary | ICD-10-CM

## 2025-04-09 PROCEDURE — 99212 OFFICE O/P EST SF 10 MIN: CPT | Performed by: STUDENT IN AN ORGANIZED HEALTH CARE EDUCATION/TRAINING PROGRAM

## 2025-04-09 ASSESSMENT — ENCOUNTER SYMPTOMS
COLOR CHANGE: 0
ABDOMINAL PAIN: 0
ABDOMINAL DISTENTION: 0
TROUBLE SWALLOWING: 0
RECTAL PAIN: 0
NAUSEA: 0
CHILLS: 0
FEVER: 0
SHORTNESS OF BREATH: 0
BLOOD IN STOOL: 0
VOMITING: 0
UNEXPECTED WEIGHT CHANGE: 0
ANAL BLEEDING: 0
DIARRHEA: 0
CONSTIPATION: 0

## 2025-04-09 NOTE — PATIENT INSTRUCTIONS
For Fiber    Take 1 capful/1 packet of Miralax at night with 8oz of liquid. Take three times over 7 days for the 1st week. May have some loose stool/diarrhea as the bowels are emptied. Can adjust the dose of Miralax as needed to obtain the preferred stool consistency and frequency.    Start a fiber supplement daily. Suggest Benefiber or Cirtrucel. Start low and increase dose slow. Start with 1 tsp/ 1 capsule/ 1 gummy of fiber each day. Must drink atleast 8 cups of water throughout the day. Can increase the dose of fiber after 1 week of daily use. It is important to take this DAILY.     Fiber is what helps keep stool moving throughout the colon and keeps stool from being too hard or too loose!     Goal fiber 25 grams daily   64 oz water

## 2025-04-09 NOTE — PROGRESS NOTES
Chief Complaint:  Chief Complaint   Patient presents with    bloating       Called for f.up of abdominal bloating and altered bowel movements.     Since last visit KUB done and normal. Overall feeling better    Has been taking a prebiotic and probiotic supplement. OMOGS Womens probiotic   Also trying to eat more fruits and vegetables.   Still with bloating but really only around her period  BM consistency can vary and still has intermittent hard stool.   Denies hematochezia      BM every other day  Longest between BM 3 days  [place] 3-6  Intermittent straining   No abdominal pain, occasional cramping      Fruit: banana daily, maybe apple  Veggies: 1 serving/day  Whole Grain: cut out bread recently, 0  Water: 32-60oz daily   Virtual or Telephone Consent    An interactive audio and video telecommunication system which permits real time communications between the patient (at the originating site) and provider (at the distant site) was utilized to provide this telehealth service.   Verbal consent was requested and obtained from Rissa Rios on this date, 04/09/25 for a telehealth visit and the patient's location was confirmed at the time of the visit.             Review of Systems   Constitutional:  Negative for chills, fever and unexpected weight change.   HENT:  Negative for trouble swallowing.    Respiratory:  Negative for shortness of breath.    Cardiovascular:  Negative for chest pain.   Gastrointestinal:  Negative for abdominal distention, abdominal pain, anal bleeding, blood in stool, constipation, diarrhea, nausea, rectal pain and vomiting.   Skin:  Negative for color change.     No family history on file.    Medications    Current Outpatient Medications:     tranexamic acid (Lysteda) 650 mg tablet tablet, Take 2 tablets (1,300 mg) by mouth 3 times a day as needed (For cramping related to periods.). (Patient not taking: Reported on 3/18/2025), Disp: 30 tablet, Rfl: 2    Vitals  LMP 02/23/2025 (Exact Date)  "    Physical Exam  Constitutional:       General: She is not in acute distress.     Appearance: Normal appearance.   HENT:      Head: Normocephalic.      Nose: Nose normal.   Eyes:      General: No scleral icterus.     Extraocular Movements: Extraocular movements intact.      Conjunctiva/sclera: Conjunctivae normal.   Pulmonary:      Effort: Pulmonary effort is normal.   Skin:     Coloration: Skin is not jaundiced.   Neurological:      Mental Status: She is alert.   Psychiatric:         Mood and Affect: Mood normal.           Labs:  No results found for: \"AFP\"No results found for: \"ASMAB\", \"MITOAB\"No results found for: \"ROGERS\"No results found for: \"ASMAB\", \"MITOAB\"No results found for: \"[id number]\"No results found for: \"HEPCAB\"No results found for: \"HEPATOT\", \"HEPAIGM\", \"HEPBCIGM\", \"HEPBCAB\", \"HBEAG\", \"HEPCAB\"No results found for: \"HIV1X2\"No results found for: \"IRON\", \"TIBC\", \"FERRITIN\"No results found for: \"INR\", \"PROTIME\"  Lab Results   Component Value Date    TSH 2.78 09/26/2024       Radiology  No image results found.      A/P   Rissa was seen today for bloating.  Diagnoses and all orders for this visit:  Altered bowel habits (Primary)  Abdominal bloating  -     Follow Up In Gastroenterology  Hematochezia     Problem List Items Addressed This Visit             ICD-10-CM    Abdominal bloating R14.0     Improved now only related to periods  - management as above         Altered bowel habits - Primary R19.4     Bowel movements vary between [place] 3-6, daily to every three days. Several episodes of syncope in past related to bowel movement. Diet is low in fiber. KUB done and normal. Sxs improved w diet changes and probiotic supplement  - high fiber diet discussed   - start fiber supplement: Metamucil, Benefiber or Citrucel generic; take 1 tbsp/1 gummies/1 capsules daily for one week with adequate water, if no improvement in bowel consistency after one week increase to two and so on. Must take daily   - can c/w " probiotic supplement, can also increase probiotic rich foods such as kimchi, sauerkraut, yogurt etc              Hematochezia K92.1     resolved

## 2025-04-09 NOTE — ASSESSMENT & PLAN NOTE
Bowel movements vary between [place] 3-6, daily to every three days. Several episodes of syncope in past related to bowel movement. Diet is low in fiber. KUB done and normal. Sxs improved w diet changes and probiotic supplement  - high fiber diet discussed   - start fiber supplement: Metamucil, Benefiber or Citrucel generic; take 1 tbsp/1 gummies/1 capsules daily for one week with adequate water, if no improvement in bowel consistency after one week increase to two and so on. Must take daily   - can c/w probiotic supplement, can also increase probiotic rich foods such as kimchi, sauerkraut, yogurt etc

## 2025-10-06 ENCOUNTER — APPOINTMENT (OUTPATIENT)
Dept: OBSTETRICS AND GYNECOLOGY | Facility: CLINIC | Age: 25
End: 2025-10-06
Payer: COMMERCIAL